# Patient Record
Sex: FEMALE | Race: WHITE | NOT HISPANIC OR LATINO | URBAN - METROPOLITAN AREA
[De-identification: names, ages, dates, MRNs, and addresses within clinical notes are randomized per-mention and may not be internally consistent; named-entity substitution may affect disease eponyms.]

---

## 2019-12-25 ENCOUNTER — EMERGENCY (EMERGENCY)
Facility: HOSPITAL | Age: 29
LOS: 1 days | Discharge: HOME | End: 2019-12-25
Attending: EMERGENCY MEDICINE
Payer: MEDICAID

## 2019-12-25 VITALS
RESPIRATION RATE: 16 BRPM | OXYGEN SATURATION: 99 % | DIASTOLIC BLOOD PRESSURE: 79 MMHG | SYSTOLIC BLOOD PRESSURE: 120 MMHG | HEART RATE: 72 BPM | TEMPERATURE: 98 F

## 2019-12-25 VITALS — WEIGHT: 195.11 LBS | HEIGHT: 66 IN

## 2019-12-25 DIAGNOSIS — O34.219 MATERNAL CARE FOR UNSPECIFIED TYPE SCAR FROM PREVIOUS CESAREAN DELIVERY: Chronic | ICD-10-CM

## 2019-12-25 PROCEDURE — 99283 EMERGENCY DEPT VISIT LOW MDM: CPT

## 2019-12-25 PROCEDURE — 71046 X-RAY EXAM CHEST 2 VIEWS: CPT

## 2019-12-25 PROCEDURE — 71046 X-RAY EXAM CHEST 2 VIEWS: CPT | Mod: 26

## 2019-12-25 RX ADMIN — Medication 1 TABLET(S): at 12:05

## 2019-12-25 NOTE — ED PROVIDER NOTE - ATTENDING CONTRIBUTION TO CARE
I have seen and evaluated this patient with the resident.   I agree with the findings  unless other wise stated.  I have made appropriate changes in documentations where needed, After my face to face bedside evaluation, I am further  noting: Patient is a 29 year old who presents with complaints of sinus congestion and cough. She is visiting from [place] for the holidays. Patient states that about 4 weeks ago, she began having a cough with sinus congestion. She saw her PCP regarding this and was told that her symptoms were likely viral in nature. She was told to began symptomatic treatment. She has been using Tylenol, Advil, and decongestants to try to manage her symptoms, but she did not note much improvement. A few days ago, she states that her symptoms worsened and she began having fevers, purulent drainage from the nose, and bilateral ear pain with popping sensation. She noted a fever of 100.7F this morning and did not feel well, prompting her to come to the ED for further evaluation. Pt has low grade fever tender on percussing paranasal sinuses and worsening of s/s on bending forward likely sinusitis decongestant antibiotics PMD f/u.  Compliance to diet and medicines stressed will have out patient follow up. Return instructions discussed with patient and patient understood --Abhishek

## 2019-12-25 NOTE — ED PROVIDER NOTE - NSFOLLOWUPINSTRUCTIONS_ED_ALL_ED_FT
- Please continue taking your Augmentin for the next 5 days for treatment of your sinus infection. Complete the entire course as directed.   - Please follow-up with your PCP regarding your recent ED visit to ensure that your symptoms resolve as expected.   - Return to the ED if you develop any adverse reaction to the medication, worsening fevers, chills, trouble breathing, cough, or blood in sputum.  - Consider using Afrin to help you with your congestion. Do not use this any longer than 3 days to prevent a rebound sinusitis.

## 2019-12-25 NOTE — ED PROVIDER NOTE - CLINICAL SUMMARY MEDICAL DECISION MAKING FREE TEXT BOX
Patient presenting with approximately 4 weeks of sinus symptoms, beginning to have fevers this week with measured oral temperature of 100.7F this morning. Concern for bacterial sinusitis given duration of symptoms. Will check CXR to rule out atypical pneumonia. Otherwise, will start treatment of sinusitis with Augmentin. Will recommend Afrin for continued decongestion for the next 3-5 days for symptomatic relief.

## 2019-12-25 NOTE — ED PROVIDER NOTE - PATIENT PORTAL LINK FT
You can access the FollowMyHealth Patient Portal offered by [facility] by registering at the following website: [email or web address]. By joining [employer]’s FollowMyHealth portal, you will also be able to view your health information using other applications (apps) compatible with our system.

## 2019-12-25 NOTE — ED PROVIDER NOTE - PHYSICAL EXAMINATION
Patient with frontal and maxillary sinus tenderness. She has clear fluid and bulging TMs bilaterally. She has rhinorrhea and erythema of the nasal turbinates. There is posterior erythema of the oropharynx. No palpable lymph nodes.

## 2019-12-25 NOTE — ED PROVIDER NOTE - OBJECTIVE STATEMENT
Patient is a 29 year old who presents with complaints of sinus congestion and cough. She is visiting from [place] for the holidays. Patient states that about 4 weeks ago, she began having a cough with sinus congestion. She saw her PCP regarding this and was told that her symptoms were likely viral in nature. She was told to began symptomatic treatment. She has been using Tylenol, Advil, and decongestants to try to manage her symptoms, but she did not note much improvement. A few days ago, she states that her symptoms worsened and she began having fevers, purulent drainage from the nose, and bilateral ear pain with popping sensation. She noted a fever of 100.7F this morning and did not feel well, prompting her to come to the ED for further evaluation.     She states that she recently had a  6 months ago to deliver a set of twins, 1 boy and 1 girl. She states that she is not breastfeeding at this time. Both children are healthy at this time. The girl had similar sinus issues to the mother 2 weeks ago, but this has since resolved.